# Patient Record
Sex: FEMALE | Race: WHITE | Employment: OTHER | URBAN - METROPOLITAN AREA
[De-identification: names, ages, dates, MRNs, and addresses within clinical notes are randomized per-mention and may not be internally consistent; named-entity substitution may affect disease eponyms.]

---

## 2017-02-27 ENCOUNTER — TRANSFERRED RECORDS (OUTPATIENT)
Dept: HEALTH INFORMATION MANAGEMENT | Facility: CLINIC | Age: 72
End: 2017-02-27

## 2018-02-28 ENCOUNTER — TRANSFERRED RECORDS (OUTPATIENT)
Dept: HEALTH INFORMATION MANAGEMENT | Facility: CLINIC | Age: 73
End: 2018-02-28

## 2018-03-29 ENCOUNTER — OFFICE VISIT (OUTPATIENT)
Dept: OBGYN | Facility: CLINIC | Age: 73
End: 2018-03-29
Payer: MEDICARE

## 2018-03-29 VITALS
WEIGHT: 101 LBS | BODY MASS INDEX: 19.07 KG/M2 | DIASTOLIC BLOOD PRESSURE: 72 MMHG | HEIGHT: 61 IN | SYSTOLIC BLOOD PRESSURE: 122 MMHG

## 2018-03-29 DIAGNOSIS — Z01.419 ENCOUNTER FOR GYNECOLOGICAL EXAMINATION WITHOUT ABNORMAL FINDING: Primary | ICD-10-CM

## 2018-03-29 PROCEDURE — G0101 CA SCREEN;PELVIC/BREAST EXAM: HCPCS | Performed by: OBSTETRICS & GYNECOLOGY

## 2018-03-29 ASSESSMENT — ANXIETY QUESTIONNAIRES
6. BECOMING EASILY ANNOYED OR IRRITABLE: NOT AT ALL
7. FEELING AFRAID AS IF SOMETHING AWFUL MIGHT HAPPEN: NOT AT ALL
5. BEING SO RESTLESS THAT IT IS HARD TO SIT STILL: NOT AT ALL
IF YOU CHECKED OFF ANY PROBLEMS ON THIS QUESTIONNAIRE, HOW DIFFICULT HAVE THESE PROBLEMS MADE IT FOR YOU TO DO YOUR WORK, TAKE CARE OF THINGS AT HOME, OR GET ALONG WITH OTHER PEOPLE: NOT DIFFICULT AT ALL
2. NOT BEING ABLE TO STOP OR CONTROL WORRYING: NOT AT ALL
1. FEELING NERVOUS, ANXIOUS, OR ON EDGE: NOT AT ALL
3. WORRYING TOO MUCH ABOUT DIFFERENT THINGS: NOT AT ALL
GAD7 TOTAL SCORE: 0

## 2018-03-29 ASSESSMENT — PATIENT HEALTH QUESTIONNAIRE - PHQ9: 5. POOR APPETITE OR OVEREATING: NOT AT ALL

## 2018-03-29 NOTE — PROGRESS NOTES
Virgen is a 72 year old  female who presents for Medicare Limited exam.     Do you have a Health Care Directive?: Yes: Patient states has Advance Directive and will bring in a copy to clinic.    Fall risk:   Fall Risk Assessment completed per order.    HPI :  Had a herniated disc in lumbar spine. Had some steroid injections. Feels back to normal.   Exercising regularly.    No vb/discharge.     Mammo at [facility] Feb.  Dexa . Think had one recently, will check with PCP.  Colonoscopy 5-6yrs ago, will get records. Ours states .    GYNECOLOGIC HISTORY:  No LMP recorded. Patient is postmenopausal..   reports that she has never smoked. She has never used smokeless tobacco.    STD testing offered?  Declined  Last PHQ-9 score on record=   PHQ-9 SCORE 3/29/2018   Total Score 0     Last GAD7 score on record=   NATE-7 SCORE 2016 3/29/2018   Total Score 0 0       HEALTH MAINTENANCE:  Cholesterol: (No results found for: CHOL   Last Mammo: 19, Result: normal, Next Mammo: next year   Pap: (No results found for: PAP )  DEXA:  , pt states had one more recent at her PCP  Colonoscopy:  5-6 years ago per pt, last result we have is from , Result:  normal, Next Colonoscopy: due.    HISTORY:  Obstetric History       T2      L2     SAB0   TAB0   Ectopic0   Multiple0   Live Births2       # Outcome Date GA Lbr Laureano/2nd Weight Sex Delivery Anes PTL Lv   2 Term         HENRIETTA   1 Term         HENRIETTA        Past Medical History:   Diagnosis Date     Abnormal mammogram 16    [facility]; [facility] dx imaging, f/u 1yr     Osteopenia      Past Surgical History:   Procedure Laterality Date     deviated septum       SURGICAL HISTORY OF -       appendectomy     Family History   Problem Relation Age of Onset     Fractures Mother      hip     HEART DISEASE Other      Uterine Cancer Paternal Grandmother      Social History     Social History     Marital status:      Spouse name: N/A     Number of children: 2      "Years of education: N/A     Occupational History      Retired     Social History Main Topics     Smoking status: Never Smoker     Smokeless tobacco: Never Used     Alcohol use 0.0 oz/week     0 Standard drinks or equivalent per week     Drug use: No     Sexual activity: Yes     Partners: Male     Birth control/ protection: Post-menopausal     Other Topics Concern     None     Social History Narrative     Current Outpatient Prescriptions   Medication Sig     Cholecalciferol (VITAMIN D3 PO) Take by mouth daily     No current facility-administered medications for this visit.      Allergies   Allergen Reactions     Codeine        Past medical, surgical, social and family history were reviewed and updated in [facility].    EXAM:  /72  Ht 5' 1.25\" (1.556 m)  Wt 101 lb (45.8 kg)  Breastfeeding? No  BMI 18.93 kg/m2   BMI: Body mass index is 18.93 kg/(m^2).    Constitutional: Appearance: Well nourished, well developed alert, in no acute distress  Breasts: Inspection of Breasts:  No lymphadenopathy present    Palpation of Breasts and Axillae:  No masses present on palpation, no  breast tenderness    Axillary Lymph Nodes:  No lymphadenopathy present  Neurologic/Psychiatric:    Mental Status:  Oriented X3     Pelvic Exam:  External Genitalia:     Normal appearance for age, no discharge present, no tenderness present, no inflammatory lesions present, color normal  Vagina:     Normal vaginal vault without central or paravaginal defects, ATROPHIC  Bladder:     Nontender to palpation  Urethra:   Urethral Body:  Urethra palpation normal, urethra structural support normal   Urethral Meatus:  No erythema or lesions present  Cervix:     Appearance healthy, no lesions present, nontender to palpation, no bleeding present  Uterus:     Nontender to palpation, no masses present, position anteflexed, mobility: normal  Adnexa:     No adnexal tenderness present, no adnexal masses present  Perineum:     Perineum within normal limits, no " evidence of trauma, no rashes or skin lesions present  Inguinal Lymph Nodes:     No lymphadenopathy present      Body mass index is 18.93 kg/(m^2).  Weight management plan noted, stable and monitoring   reports that she has never smoked. She has never used smokeless tobacco.      ASSESSMENT:  72 year old female with satisfactory annual exam.    ICD-10-CM    1. Encounter for gynecological examination without abnormal finding Z01.419 Medicare Limited Visit       COUNSELING:   Reviewed preventive health counseling, as reflected in patient instructions       Osteoporosis Prevention/Bone Health    PLAN/PATIENT INSTRUCTIONS:    -UTD for cervical cancer screening.No further paps needed  -Breast self awareness discussed. UTD for mammogram.  -Colonoscopy. Will check with PCP on last date of exam.  -Osteoporosis prevention discussed. Will check with PCP on last date of Dexa  -PCP manages labs. Next appt in may  -PMB precautions  -Return one year for next annual exam      Erika Marinellis, DO

## 2018-03-30 ASSESSMENT — ANXIETY QUESTIONNAIRES: GAD7 TOTAL SCORE: 0

## 2018-03-30 ASSESSMENT — PATIENT HEALTH QUESTIONNAIRE - PHQ9: SUM OF ALL RESPONSES TO PHQ QUESTIONS 1-9: 0

## 2019-03-19 ENCOUNTER — TRANSFERRED RECORDS (OUTPATIENT)
Dept: HEALTH INFORMATION MANAGEMENT | Facility: CLINIC | Age: 74
End: 2019-03-19

## 2020-05-19 ENCOUNTER — TRANSFERRED RECORDS (OUTPATIENT)
Dept: HEALTH INFORMATION MANAGEMENT | Facility: CLINIC | Age: 75
End: 2020-05-19

## 2021-05-26 NOTE — PROGRESS NOTES
Virgen is a 75 year old  female who presents for Medicare Limited exam.     Do you have a Health Care Directive?: Yes, patient states has an Advance Care Planning document and will bring a copy to the clinic.    Fall risk:   Fallen 2 or more times in the past year?: No  Any fall with injury in the past year?: No    HPI :  Last visit with me 3/2018.    Last Dexa -in our system. Had had f/u after last visit. Has been seeing an Endocrinologist in [place]. Has been on prolia for perhaps 3yrs.     No bleeding/discharge.    Is exercising, walking 1hr per day.         GYNECOLOGIC HISTORY:  No LMP recorded. Patient is postmenopausal..   reports that she has never smoked. She has never used smokeless tobacco.    STD testing offered?  Declined  Last PHQ-9 score on record=   PHQ-9 SCORE 2021   PHQ-9 Total Score 0     Last GAD7 score on record=   NATE-7 SCORE 2016 3/29/2018 2021   Total Score 0 0 0       HEALTH MAINTENANCE:  Cholesterol: (No results found for: CHOL   Last Mammo: One year ago, Result: Normal, Next Mammo: Today @[facility]  Pap: (No results found for: PAP )  DEXA:    Colonoscopy:   in [place], Result:  Normal, Next Colonoscopy: 9 years.    HISTORY:  OB History    Para Term  AB Living   2 2 2 0 0 2   SAB TAB Ectopic Multiple Live Births   0 0 0 0 2      # Outcome Date GA Lbr Laureano/2nd Weight Sex Delivery Anes PTL Lv   2 Term         HENRIETTA   1 Term         HENRIETTA     Past Medical History:   Diagnosis Date     Abnormal mammogram 16    [facility]; bening dx imaging, f/u 1yr     Osteopenia      Past Surgical History:   Procedure Laterality Date     CATARACT EXTRACTION       deviated septum  1974     SURGICAL HISTORY OF -   1956    appendectomy     Family History   Problem Relation Age of Onset     Fractures Mother         hip     Heart Disease Other      Uterine Cancer Paternal Grandmother      Social History     Socioeconomic History     Marital status:      Spouse  name: Not on file     Number of children: 2     Years of education: Not on file     Highest education level: Not on file   Occupational History     Employer: RETIRED   Social Needs     Financial resource strain: Not on file     Food insecurity     Worry: Not on file     Inability: Not on file     Transportation needs     Medical: Not on file     Non-medical: Not on file   Tobacco Use     Smoking status: Never Smoker     Smokeless tobacco: Never Used   Substance and Sexual Activity     Alcohol use: Yes     Alcohol/week: 0.0 standard drinks     Drug use: No     Sexual activity: Yes     Partners: Male     Birth control/protection: Post-menopausal   Lifestyle     Physical activity     Days per week: Not on file     Minutes per session: Not on file     Stress: Not on file   Relationships     Social connections     Talks on phone: Not on file     Gets together: Not on file     Attends [employer] service: Not on file     Active member of club or organization: Not on file     Attends meetings of clubs or organizations: Not on file     Relationship status: Not on file     Intimate partner violence     Fear of current or ex partner: Not on file     Emotionally abused: Not on file     Physically abused: Not on file     Forced sexual activity: Not on file   Other Topics Concern     Parent/sibling w/ CABG, MI or angioplasty before 65F 55M? Not Asked   Social History Narrative     Not on file     Current Outpatient Medications   Medication Sig     Cholecalciferol (VITAMIN D3 PO) Take by mouth daily     denosumab (PROLIA) 60 MG/ML SOSY injection Inject 60 mg Subcutaneous     loperamide (IMODIUM) 2 MG capsule Take 2 mg by mouth     meloxicam (MOBIC) 15 MG tablet Take 15 mg by mouth     PSEUDOEPHEDRINE-ACETAMINOPHEN PO      No current facility-administered medications for this visit.      Allergies   Allergen Reactions     Codeine        Past medical, surgical, social and family history were reviewed and updated in [facility].    EXAM:  BP  "102/62   Ht 1.562 m (5' 1.5\")   Wt 43.3 kg (95 lb 6.4 oz)   BMI 17.73 kg/m     BMI: Body mass index is 17.73 kg/m .    Constitutional: Appearance: Well nourished, well developed alert, in no acute distress  Breasts: Inspection of Breasts:  No lymphadenopathy present    Palpation of Breasts and Axillae:  No masses present on palpation, no  breast tenderness    Axillary Lymph Nodes:  No lymphadenopathy present  Neurologic/Psychiatric:    Mental Status:  Oriented X3     Pelvic Exam:  External Genitalia:     Normal appearance for age, no discharge present, no tenderness present, no inflammatory lesions present, color normal  Vagina:     Normal vaginal vault without central or paravaginal defects, no discharge present, no inflammatory lesions present, no masses present  Bladder:     Nontender to palpation  Urethra:   Urethral Body:  Urethra palpation normal, urethra structural support normal   Urethral Meatus:  No erythema or lesions present  Cervix:     Appearance healthy, no lesions present, nontender to palpation, no bleeding present  Uterus:     Uterus: firm, normal sized and nontender, midplane in position.   Adnexa:     No adnexal tenderness present, no adnexal masses present  Perineum:     Perineum within normal limits, no evidence of trauma, no rashes or skin lesions present  Anus:     Anus within normal limits, no hemorrhoids present  Inguinal Lymph Nodes:     No lymphadenopathy present  Pubic Hair:     Normal pubic hair distribution for age  Genitalia and Groin:     No rashes present, no lesions present, no areas of discoloration, no masses present      Body mass index is 17.73 kg/m .     reports that she has never smoked. She has never used smokeless tobacco.      ASSESSMENT:  75 year old female with satisfactory annual exam.    ICD-10-CM    1. Encounter for gynecological examination without abnormal finding  Z01.419    2. Pap smear of cervix not needed  Z53.8        COUNSELING:   Reviewed preventive health " counseling, as reflected in patient instructions       Osteoporosis prevention/bone health    PLAN/PATIENT INSTRUCTIONS:    Patient Instructions   -Daily total calcium intake (between food/supplements) should be 1200mg which equates to 5 servings calcium containing food per day; VItamin D 1000IU.   Foods rich in calcium are: milk, cheese, yogurt, seafood, sardines and canned salmon, leafy green vegetables such as brianna greens, spinach and kale, beans and lentils, almonds, seeds (poppy, sesame, celery, kusum), rhubarb, dried fruit such as figs, whey protein, tofu and edamame, amaranth, other foods with added calcium such as orange juice and some cereals.   If adequate amount not taken in diet, then a supplement may be needed.     -I also recommend increasing your dietary fiber by starting Metamucil (powder mixed in glass of water) twice daily       -NO further paps needed.  -Breast self awareness discussed. UTD for mammogram, receives at [facility].  -Discussed exercise-making plan, strength training. Nutrition encouraged.  -Colonoscopy UTD  -Osteoporosis prevention discussed. Follows with Endo. On prolia  -PCP manages labs  -PMB precautions  -Return two years for next breast/pelvic exam        Erika Pradhan Masters, DO

## 2021-05-27 ENCOUNTER — OFFICE VISIT (OUTPATIENT)
Dept: OBGYN | Facility: CLINIC | Age: 76
End: 2021-05-27
Payer: COMMERCIAL

## 2021-05-27 ENCOUNTER — TRANSFERRED RECORDS (OUTPATIENT)
Dept: HEALTH INFORMATION MANAGEMENT | Facility: CLINIC | Age: 76
End: 2021-05-27

## 2021-05-27 VITALS
DIASTOLIC BLOOD PRESSURE: 62 MMHG | BODY MASS INDEX: 17.55 KG/M2 | SYSTOLIC BLOOD PRESSURE: 102 MMHG | HEIGHT: 62 IN | WEIGHT: 95.4 LBS

## 2021-05-27 DIAGNOSIS — Z53.8 PAP SMEAR OF CERVIX NOT NEEDED: ICD-10-CM

## 2021-05-27 DIAGNOSIS — Z01.419 ENCOUNTER FOR GYNECOLOGICAL EXAMINATION WITHOUT ABNORMAL FINDING: Primary | ICD-10-CM

## 2021-05-27 PROCEDURE — G0101 CA SCREEN;PELVIC/BREAST EXAM: HCPCS | Performed by: OBSTETRICS & GYNECOLOGY

## 2021-05-27 RX ORDER — MELOXICAM 15 MG/1
15 TABLET ORAL
COMMUNITY

## 2021-05-27 RX ORDER — DENOSUMAB 60 MG/ML
60 INJECTION SUBCUTANEOUS
COMMUNITY

## 2021-05-27 RX ORDER — LOPERAMIDE HCL 2 MG
2 CAPSULE ORAL
COMMUNITY

## 2021-05-27 ASSESSMENT — ANXIETY QUESTIONNAIRES
3. WORRYING TOO MUCH ABOUT DIFFERENT THINGS: NOT AT ALL
5. BEING SO RESTLESS THAT IT IS HARD TO SIT STILL: NOT AT ALL
2. NOT BEING ABLE TO STOP OR CONTROL WORRYING: NOT AT ALL
7. FEELING AFRAID AS IF SOMETHING AWFUL MIGHT HAPPEN: NOT AT ALL
1. FEELING NERVOUS, ANXIOUS, OR ON EDGE: NOT AT ALL
6. BECOMING EASILY ANNOYED OR IRRITABLE: NOT AT ALL
IF YOU CHECKED OFF ANY PROBLEMS ON THIS QUESTIONNAIRE, HOW DIFFICULT HAVE THESE PROBLEMS MADE IT FOR YOU TO DO YOUR WORK, TAKE CARE OF THINGS AT HOME, OR GET ALONG WITH OTHER PEOPLE: NOT DIFFICULT AT ALL
GAD7 TOTAL SCORE: 0

## 2021-05-27 ASSESSMENT — PATIENT HEALTH QUESTIONNAIRE - PHQ9
SUM OF ALL RESPONSES TO PHQ QUESTIONS 1-9: 0
5. POOR APPETITE OR OVEREATING: NOT AT ALL

## 2021-05-27 ASSESSMENT — MIFFLIN-ST. JEOR: SCORE: 878.04

## 2021-05-27 NOTE — PATIENT INSTRUCTIONS
-Daily total calcium intake (between food/supplements) should be 1200mg which equates to 5 servings calcium containing food per day; VItamin D 1000IU.   Foods rich in calcium are: milk, cheese, yogurt, seafood, sardines and canned salmon, leafy green vegetables such as brianna greens, spinach and kale, beans and lentils, almonds, seeds (poppy, sesame, celery, kusum), rhubarb, dried fruit such as figs, whey protein, tofu and edamame, amaranth, other foods with added calcium such as orange juice and some cereals.   If adequate amount not taken in diet, then a supplement may be needed.     -I also recommend increasing your dietary fiber by starting Metamucil (powder mixed in glass of water) twice daily

## 2021-05-28 ASSESSMENT — ANXIETY QUESTIONNAIRES: GAD7 TOTAL SCORE: 0

## 2022-06-15 ENCOUNTER — TRANSFERRED RECORDS (OUTPATIENT)
Dept: HEALTH INFORMATION MANAGEMENT | Facility: CLINIC | Age: 77
End: 2022-06-15